# Patient Record
Sex: FEMALE
[De-identification: names, ages, dates, MRNs, and addresses within clinical notes are randomized per-mention and may not be internally consistent; named-entity substitution may affect disease eponyms.]

---

## 2022-11-29 ENCOUNTER — APPOINTMENT (OUTPATIENT)
Dept: OBGYN | Facility: CLINIC | Age: 27
End: 2022-11-29

## 2022-12-15 ENCOUNTER — TRANSCRIPTION ENCOUNTER (OUTPATIENT)
Age: 27
End: 2022-12-15

## 2022-12-15 ENCOUNTER — APPOINTMENT (OUTPATIENT)
Dept: OBGYN | Facility: CLINIC | Age: 27
End: 2022-12-15

## 2022-12-15 VITALS
WEIGHT: 155 LBS | DIASTOLIC BLOOD PRESSURE: 75 MMHG | BODY MASS INDEX: 24.05 KG/M2 | HEIGHT: 67.32 IN | SYSTOLIC BLOOD PRESSURE: 114 MMHG

## 2022-12-15 DIAGNOSIS — Z80.1 FAMILY HISTORY OF MALIGNANT NEOPLASM OF TRACHEA, BRONCHUS AND LUNG: ICD-10-CM

## 2022-12-15 DIAGNOSIS — Z80.3 FAMILY HISTORY OF MALIGNANT NEOPLASM OF BREAST: ICD-10-CM

## 2022-12-15 PROBLEM — Z00.00 ENCOUNTER FOR PREVENTIVE HEALTH EXAMINATION: Status: ACTIVE | Noted: 2022-12-15

## 2022-12-15 PROCEDURE — 99385 PREV VISIT NEW AGE 18-39: CPT

## 2022-12-15 NOTE — PLAN
[FreeTextEntry1] : 26 y/o  LMP unknown presents for annual \par \par HCM\par f/u pap\par declines STI testing\par Happy with Liletta IUD

## 2022-12-15 NOTE — HISTORY OF PRESENT ILLNESS
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

## 2023-01-01 LAB — CYTOLOGY CVX/VAG DOC THIN PREP: NORMAL

## 2023-10-23 ENCOUNTER — NON-APPOINTMENT (OUTPATIENT)
Age: 28
End: 2023-10-23

## 2023-10-24 ENCOUNTER — APPOINTMENT (OUTPATIENT)
Dept: OBGYN | Facility: CLINIC | Age: 28
End: 2023-10-24
Payer: COMMERCIAL

## 2023-10-24 VITALS
BODY MASS INDEX: 25.05 KG/M2 | DIASTOLIC BLOOD PRESSURE: 77 MMHG | WEIGHT: 161.5 LBS | HEIGHT: 67.32 IN | SYSTOLIC BLOOD PRESSURE: 126 MMHG

## 2023-10-24 PROCEDURE — 58301 REMOVE INTRAUTERINE DEVICE: CPT

## 2023-10-25 LAB
C TRACH RRNA SPEC QL NAA+PROBE: NOT DETECTED
N GONORRHOEA RRNA SPEC QL NAA+PROBE: NOT DETECTED
SOURCE AMPLIFICATION: NORMAL

## 2023-12-19 ENCOUNTER — APPOINTMENT (OUTPATIENT)
Dept: OBGYN | Facility: CLINIC | Age: 28
End: 2023-12-19
Payer: COMMERCIAL

## 2023-12-19 VITALS
HEIGHT: 67.5 IN | WEIGHT: 159 LBS | DIASTOLIC BLOOD PRESSURE: 72 MMHG | SYSTOLIC BLOOD PRESSURE: 127 MMHG | BODY MASS INDEX: 24.66 KG/M2

## 2023-12-19 PROCEDURE — 99395 PREV VISIT EST AGE 18-39: CPT

## 2023-12-19 NOTE — PLAN
[FreeTextEntry1] : 29 y/o    LMP 23 presents for annual   HCM up to date  declines STI testing   TTC  discussed timed intercourse

## 2023-12-19 NOTE — HISTORY OF PRESENT ILLNESS
[Patient reported PAP Smear was normal] : Patient reported PAP Smear was normal [Normal Amount/Duration] :  normal amount and duration [Regular Cycle Intervals] : periods have been regular [Currently Active] : currently active [Men] : men [Vaginal] : vaginal [No] : No [Patient refuses STI testing] : Patient refuses STI testing [FreeTextEntry1] : 29 y/o    LMP 23 presents for annual   No complaints.  Trying to conceive  POB: 2016:    Student in [employer], studying [employer] science [PapSmeardate] : 2022

## 2024-02-08 ENCOUNTER — APPOINTMENT (OUTPATIENT)
Dept: OBGYN | Facility: CLINIC | Age: 29
End: 2024-02-08
Payer: COMMERCIAL

## 2024-02-08 VITALS
SYSTOLIC BLOOD PRESSURE: 112 MMHG | HEIGHT: 67.5 IN | WEIGHT: 160 LBS | BODY MASS INDEX: 24.82 KG/M2 | DIASTOLIC BLOOD PRESSURE: 71 MMHG

## 2024-02-08 DIAGNOSIS — Z32.01 ENCOUNTER FOR PREGNANCY TEST, RESULT POSITIVE: ICD-10-CM

## 2024-02-08 PROCEDURE — 99213 OFFICE O/P EST LOW 20 MIN: CPT

## 2024-02-08 PROCEDURE — 76817 TRANSVAGINAL US OBSTETRIC: CPT

## 2024-02-08 NOTE — DISCUSSION/SUMMARY
[FreeTextEntry1] : 29 y/o P1 LMp 12/19/23 EDC 9/25/24 at 7wks 2 days  positive pregnancy test +SIUP on t/v us today  PNV NT OB US F/u 3 weeks as NEW OB obtian NIPS at next visit/counseled

## 2024-02-08 NOTE — REVIEW OF SYSTEMS
[Vomiting] : no vomiting [Nausea] : nausea [Negative] : Constitutional [FreeTextEntry8] : positive pregnancy test

## 2024-02-08 NOTE — HISTORY OF PRESENT ILLNESS
[FreeTextEntry1] : 29 y/o P1 LMp 12/19/23 EDC 9/25/24 at 7wks 2 days  positiv e pregnancy test + nausea/no vomiting, no VB

## 2024-03-04 ENCOUNTER — APPOINTMENT (OUTPATIENT)
Dept: OBGYN | Facility: CLINIC | Age: 29
End: 2024-03-04
Payer: COMMERCIAL

## 2024-03-04 VITALS
BODY MASS INDEX: 23.89 KG/M2 | HEIGHT: 67.5 IN | SYSTOLIC BLOOD PRESSURE: 121 MMHG | WEIGHT: 154 LBS | DIASTOLIC BLOOD PRESSURE: 75 MMHG

## 2024-03-04 DIAGNOSIS — Z34.91 ENCOUNTER FOR SUPERVISION OF NORMAL PREGNANCY, UNSPECIFIED, FIRST TRIMESTER: ICD-10-CM

## 2024-03-04 PROCEDURE — 0500F INITIAL PRENATAL CARE VISIT: CPT

## 2024-03-06 LAB
ABO + RH PNL BLD: NORMAL
B19V IGG SER QL IA: 0.16 INDEX
B19V IGG+IGM SER-IMP: NEGATIVE
B19V IGG+IGM SER-IMP: NORMAL
B19V IGM FLD-ACNC: 0.19 INDEX
B19V IGM SER-ACNC: NEGATIVE
BACTERIA UR CULT: NORMAL
BASOPHILS # BLD AUTO: 0.04 K/UL
BASOPHILS NFR BLD AUTO: 0.6 %
BLD GP AB SCN SERPL QL: NORMAL
C TRACH RRNA SPEC QL NAA+PROBE: NOT DETECTED
CMV IGG SERPL QL: 2.2 U/ML
CMV IGG SERPL-IMP: POSITIVE
CMV IGM SERPL QL: 51.1 AU/ML
CMV IGM SERPL QL: POSITIVE
EOSINOPHIL # BLD AUTO: 0.09 K/UL
EOSINOPHIL NFR BLD AUTO: 1.3 %
HBV SURFACE AG SER QL: NONREACTIVE
HCT VFR BLD CALC: 35 %
HCV AB SER QL: NONREACTIVE
HCV S/CO RATIO: 0.11 S/CO
HGB A MFR BLD: 96.7 %
HGB A2 MFR BLD: 2.9 %
HGB BLD-MCNC: 12 G/DL
HGB F MFR BLD: 0.4 %
HGB FRACT BLD-IMP: NORMAL
HIV1+2 AB SPEC QL IA.RAPID: NONREACTIVE
IMM GRANULOCYTES NFR BLD AUTO: 0.4 %
LEAD BLD-MCNC: <1 UG/DL
LYMPHOCYTES # BLD AUTO: 1.82 K/UL
LYMPHOCYTES NFR BLD AUTO: 26.5 %
MAN DIFF?: NORMAL
MCHC RBC-ENTMCNC: 30.5 PG
MCHC RBC-ENTMCNC: 34.3 GM/DL
MCV RBC AUTO: 89.1 FL
MEV IGG FLD QL IA: <5 AU/ML
MEV IGG+IGM SER-IMP: NEGATIVE
MONOCYTES # BLD AUTO: 0.39 K/UL
MONOCYTES NFR BLD AUTO: 5.7 %
N GONORRHOEA RRNA SPEC QL NAA+PROBE: NOT DETECTED
NEUTROPHILS # BLD AUTO: 4.5 K/UL
NEUTROPHILS NFR BLD AUTO: 65.5 %
PLATELET # BLD AUTO: 276 K/UL
RBC # BLD: 3.93 M/UL
RBC # FLD: 13.6 %
RUBV IGG FLD-ACNC: 3.5 INDEX
RUBV IGG SER-IMP: POSITIVE
SOURCE AMPLIFICATION: NORMAL
T PALLIDUM AB SER QL IA: NEGATIVE
TSH SERPL-ACNC: 0.97 UIU/ML
VZV AB TITR SER: POSITIVE
VZV IGG SER IF-ACNC: 1377 INDEX
WBC # FLD AUTO: 6.87 K/UL

## 2024-03-08 LAB — FMR1 GENE MUT ANL BLD/T: NORMAL

## 2024-03-09 LAB — AR GENE MUT ANL BLD/T: NORMAL

## 2024-03-13 ENCOUNTER — ASOB RESULT (OUTPATIENT)
Age: 29
End: 2024-03-13

## 2024-03-13 ENCOUNTER — APPOINTMENT (OUTPATIENT)
Dept: ANTEPARTUM | Facility: CLINIC | Age: 29
End: 2024-03-13
Payer: COMMERCIAL

## 2024-03-13 PROCEDURE — 76801 OB US < 14 WKS SINGLE FETUS: CPT

## 2024-03-13 PROCEDURE — 76813 OB US NUCHAL MEAS 1 GEST: CPT | Mod: 59

## 2024-03-17 LAB — CFTR MUT TESTED BLD/T: NEGATIVE

## 2024-03-18 ENCOUNTER — TRANSCRIPTION ENCOUNTER (OUTPATIENT)
Age: 29
End: 2024-03-18

## 2024-04-01 ENCOUNTER — TRANSCRIPTION ENCOUNTER (OUTPATIENT)
Age: 29
End: 2024-04-01

## 2024-04-04 ENCOUNTER — APPOINTMENT (OUTPATIENT)
Dept: OBGYN | Facility: CLINIC | Age: 29
End: 2024-04-04
Payer: COMMERCIAL

## 2024-04-04 VITALS
SYSTOLIC BLOOD PRESSURE: 107 MMHG | WEIGHT: 157 LBS | DIASTOLIC BLOOD PRESSURE: 71 MMHG | BODY MASS INDEX: 24.35 KG/M2 | HEIGHT: 67.5 IN

## 2024-04-04 DIAGNOSIS — Z34.92 ENCOUNTER FOR SUPERVISION OF NORMAL PREGNANCY, UNSPECIFIED, SECOND TRIMESTER: ICD-10-CM

## 2024-04-04 PROCEDURE — 0502F SUBSEQUENT PRENATAL CARE: CPT

## 2024-04-08 ENCOUNTER — NON-APPOINTMENT (OUTPATIENT)
Age: 29
End: 2024-04-08

## 2024-04-08 LAB
AFP MOM: 0.91
AFP VALUE: 29.8 NG/ML
ALPHA FETOPROTEIN SERUM COMMENT: NORMAL
ALPHA FETOPROTEIN SERUM INTERPRETATION: NORMAL
ALPHA FETOPROTEIN SERUM RESULTS: NORMAL
ALPHA FETOPROTEIN SERUM TEST RESULTS: NORMAL
GESTATIONAL AGE BASED ON: NORMAL
GESTATIONAL AGE ON COLLECTION DATE: 15.4 WEEKS
INSULIN DEP DIABETES: NO
MATERNAL AGE AT EDD AFP: 28.9 YR
MULTIPLE GESTATION: NO
OSBR RISK 1 IN: NORMAL
RACE: NORMAL
WEIGHT AFP: 139 LBS

## 2024-04-11 DIAGNOSIS — B34.3 PARVOVIRUS INFECTION, UNSPECIFIED: ICD-10-CM

## 2024-04-11 DIAGNOSIS — Z34.92 ENCOUNTER FOR SUPERVISION OF NORMAL PREGNANCY, UNSPECIFIED, SECOND TRIMESTER: ICD-10-CM

## 2024-04-15 LAB
B19V IGG SER QL IA: 0.69 INDEX
B19V IGG+IGM SER-IMP: NEGATIVE
B19V IGG+IGM SER-IMP: NORMAL
B19V IGM FLD-ACNC: 7.92 INDEX
B19V IGM SER-ACNC: POSITIVE

## 2024-04-17 ENCOUNTER — APPOINTMENT (OUTPATIENT)
Dept: ANTEPARTUM | Facility: CLINIC | Age: 29
End: 2024-04-17
Payer: COMMERCIAL

## 2024-04-17 ENCOUNTER — NON-APPOINTMENT (OUTPATIENT)
Age: 29
End: 2024-04-17

## 2024-04-17 PROCEDURE — 76805 OB US >/= 14 WKS SNGL FETUS: CPT

## 2024-04-17 PROCEDURE — 99204 OFFICE O/P NEW MOD 45 MIN: CPT | Mod: 25

## 2024-04-19 ENCOUNTER — APPOINTMENT (OUTPATIENT)
Dept: ANTEPARTUM | Facility: CLINIC | Age: 29
End: 2024-04-19
Payer: COMMERCIAL

## 2024-04-19 ENCOUNTER — ASOB RESULT (OUTPATIENT)
Age: 29
End: 2024-04-19

## 2024-04-19 PROCEDURE — 76821 MIDDLE CEREBRAL ARTERY ECHO: CPT

## 2024-04-19 PROCEDURE — 76815 OB US LIMITED FETUS(S): CPT | Mod: 59

## 2024-04-22 ENCOUNTER — NON-APPOINTMENT (OUTPATIENT)
Age: 29
End: 2024-04-22

## 2024-04-22 ENCOUNTER — ASOB RESULT (OUTPATIENT)
Age: 29
End: 2024-04-22

## 2024-04-22 ENCOUNTER — APPOINTMENT (OUTPATIENT)
Dept: ANTEPARTUM | Facility: CLINIC | Age: 29
End: 2024-04-22
Payer: COMMERCIAL

## 2024-04-22 PROCEDURE — 76821 MIDDLE CEREBRAL ARTERY ECHO: CPT

## 2024-04-22 PROCEDURE — 76815 OB US LIMITED FETUS(S): CPT | Mod: 59

## 2024-04-23 LAB — CMV IGG AVIDITY SERPL IA-RTO: 0.89

## 2024-04-25 ENCOUNTER — ASOB RESULT (OUTPATIENT)
Age: 29
End: 2024-04-25

## 2024-04-25 ENCOUNTER — NON-APPOINTMENT (OUTPATIENT)
Age: 29
End: 2024-04-25

## 2024-04-25 ENCOUNTER — APPOINTMENT (OUTPATIENT)
Dept: ANTEPARTUM | Facility: CLINIC | Age: 29
End: 2024-04-25
Payer: COMMERCIAL

## 2024-04-25 PROCEDURE — 76815 OB US LIMITED FETUS(S): CPT | Mod: 59

## 2024-04-25 PROCEDURE — 99213 OFFICE O/P EST LOW 20 MIN: CPT | Mod: 25

## 2024-04-25 PROCEDURE — 76821 MIDDLE CEREBRAL ARTERY ECHO: CPT

## 2024-04-26 ENCOUNTER — ASOB RESULT (OUTPATIENT)
Age: 29
End: 2024-04-26

## 2024-04-26 ENCOUNTER — APPOINTMENT (OUTPATIENT)
Dept: ANTEPARTUM | Facility: CLINIC | Age: 29
End: 2024-04-26

## 2024-04-26 ENCOUNTER — OUTPATIENT (OUTPATIENT)
Dept: INPATIENT UNIT | Facility: HOSPITAL | Age: 29
LOS: 1 days | End: 2024-04-26
Payer: COMMERCIAL

## 2024-04-26 VITALS
RESPIRATION RATE: 18 BRPM | SYSTOLIC BLOOD PRESSURE: 108 MMHG | DIASTOLIC BLOOD PRESSURE: 60 MMHG | TEMPERATURE: 99 F | OXYGEN SATURATION: 100 % | HEART RATE: 69 BPM

## 2024-04-26 VITALS — SYSTOLIC BLOOD PRESSURE: 99 MMHG | DIASTOLIC BLOOD PRESSURE: 54 MMHG | HEART RATE: 67 BPM | TEMPERATURE: 98 F

## 2024-04-26 DIAGNOSIS — O26.899 OTHER SPECIFIED PREGNANCY RELATED CONDITIONS, UNSPECIFIED TRIMESTER: ICD-10-CM

## 2024-04-26 DIAGNOSIS — O98.512 OTHER VIRAL DISEASES COMPLICATING PREGNANCY, SECOND TRIMESTER: ICD-10-CM

## 2024-04-26 DIAGNOSIS — Z3A.18 18 WEEKS GESTATION OF PREGNANCY: ICD-10-CM

## 2024-04-26 DIAGNOSIS — O36.8220: ICD-10-CM

## 2024-04-26 DIAGNOSIS — B34.3 PARVOVIRUS INFECTION, UNSPECIFIED: ICD-10-CM

## 2024-04-26 LAB
HCT VFR BLD CALC: 12.2 % — CRITICAL LOW (ref 34.5–45)
HCT VFR BLD CALC: 39.5 % — SIGNIFICANT CHANGE UP (ref 34.5–45)
HGB BLD-MCNC: 14 G/DL — SIGNIFICANT CHANGE UP (ref 11.5–15.5)
HGB BLD-MCNC: 4.1 G/DL — CRITICAL LOW (ref 11.5–15.5)
PLATELET # BLD AUTO: 103 K/UL — LOW (ref 150–400)
PLATELET # BLD AUTO: 174 K/UL — SIGNIFICANT CHANGE UP (ref 150–400)

## 2024-04-26 PROCEDURE — 36415 COLL VENOUS BLD VENIPUNCTURE: CPT

## 2024-04-26 PROCEDURE — 36460 INTRAUTERINE TRANSFUSION FTL: CPT

## 2024-04-26 PROCEDURE — P9022: CPT

## 2024-04-26 PROCEDURE — 86923 COMPATIBILITY TEST ELECTRIC: CPT

## 2024-04-26 PROCEDURE — 85014 HEMATOCRIT: CPT

## 2024-04-26 PROCEDURE — 85018 HEMOGLOBIN: CPT

## 2024-04-26 PROCEDURE — G0463: CPT

## 2024-04-26 PROCEDURE — 96361 HYDRATE IV INFUSION ADD-ON: CPT

## 2024-04-26 PROCEDURE — 59012 FETAL CORD PUNCTURE PRENATAL: CPT

## 2024-04-26 PROCEDURE — 76941 ECHO GUIDE FOR TRANSFUSION: CPT

## 2024-04-26 PROCEDURE — 96365 THER/PROPH/DIAG IV INF INIT: CPT

## 2024-04-26 PROCEDURE — 86901 BLOOD TYPING SEROLOGIC RH(D): CPT

## 2024-04-26 PROCEDURE — 96372 THER/PROPH/DIAG INJ SC/IM: CPT

## 2024-04-26 PROCEDURE — 85049 AUTOMATED PLATELET COUNT: CPT

## 2024-04-26 PROCEDURE — 86900 BLOOD TYPING SEROLOGIC ABO: CPT

## 2024-04-26 PROCEDURE — 86850 RBC ANTIBODY SCREEN: CPT

## 2024-04-26 PROCEDURE — 76941 ECHO GUIDE FOR TRANSFUSION: CPT | Mod: 26

## 2024-04-26 RX ORDER — CISATRACURIUM BESYLATE 2 MG/ML
0.08 INJECTION INTRAVENOUS ONCE
Refills: 0 | Status: DISCONTINUED | OUTPATIENT
Start: 2024-04-26 | End: 2024-04-26

## 2024-04-26 RX ORDER — SODIUM CHLORIDE 9 MG/ML
1000 INJECTION, SOLUTION INTRAVENOUS
Refills: 0 | Status: DISCONTINUED | OUTPATIENT
Start: 2024-04-26 | End: 2024-05-10

## 2024-04-26 RX ORDER — BUTORPHANOL TARTRATE 2 MG/ML
2 INJECTION, SOLUTION INTRAMUSCULAR; INTRAVENOUS ONCE
Refills: 0 | Status: DISCONTINUED | OUTPATIENT
Start: 2024-04-26 | End: 2024-04-26

## 2024-04-26 RX ORDER — HEPARIN SODIUM 5000 [USP'U]/ML
10000 INJECTION INTRAVENOUS; SUBCUTANEOUS ONCE
Refills: 0 | Status: DISCONTINUED | OUTPATIENT
Start: 2024-04-26 | End: 2024-05-10

## 2024-04-26 RX ORDER — CEFAZOLIN SODIUM 1 G
1000 VIAL (EA) INJECTION ONCE
Refills: 0 | Status: COMPLETED | OUTPATIENT
Start: 2024-04-26 | End: 2024-04-26

## 2024-04-26 RX ORDER — LIDOCAINE HCL 20 MG/ML
10 VIAL (ML) INJECTION ONCE
Refills: 0 | Status: DISCONTINUED | OUTPATIENT
Start: 2024-04-26 | End: 2024-05-10

## 2024-04-26 RX ORDER — SODIUM CHLORIDE 9 MG/ML
1000 INJECTION, SOLUTION INTRAVENOUS ONCE
Refills: 0 | Status: COMPLETED | OUTPATIENT
Start: 2024-04-26 | End: 2024-04-26

## 2024-04-26 RX ADMIN — SODIUM CHLORIDE 1000 MILLILITER(S): 9 INJECTION, SOLUTION INTRAVENOUS at 07:23

## 2024-04-26 RX ADMIN — BUTORPHANOL TARTRATE 2 MILLIGRAM(S): 2 INJECTION, SOLUTION INTRAMUSCULAR; INTRAVENOUS at 09:36

## 2024-04-26 RX ADMIN — Medication 100 MILLIGRAM(S): at 08:16

## 2024-04-26 NOTE — OB PROVIDER TRIAGE NOTE - NSHPLABSRESULTS_GEN_ALL_CORE
Fetal H/H Pre Transfusion  Hemoglobin + Hematocrit (04.26.24 @ 10:50)    Hemoglobin: 4.1 g/dL    Hematocrit: 12.2 %    Fetal H/H Post Transfusion  Hemoglobin + Hematocrit (04.26.24 @ 11:09)    Hemoglobin: 14.0 g/dL    Hematocrit: 39.5 %    Baseline Fetal Platelet    Platelet Count - Automated (04.26.24 @ 10:50)    Platelet Count - Automated: 174 K/uL    Platelet Count - Automated (04.26.24 @ 11:09)    Platelet Count - Automated: 103 K/uL

## 2024-04-26 NOTE — PROCEDURE NOTE - ADDITIONAL PROCEDURE DETAILS
Counseling on fetal transfusion    Risks and management of fetal anemia were discussed with the patient extensively. If untreated, severe fetal anemia from parvovirus can be dangerous for a fetus and in the worst case scenario result in fetal demise. We explained that an in utero transfusion (IUT) can temporarily improve fetal anemia and prevent fetal distress or heart failure, allowing the fetus to safely remain in utero until a gestational age closer to full term. Fetal loss risk 1-2%.     I reviewed that IUT is associated with ~ 2% risk of procedure related complications that could necessitate  birth (PPROM, abruption, premature labor, infection, FHR abnormalities).     Patient received 2mg IM Stadol for anesthesia. Sterile technique was utilized. A 20 gauge needle was used to enter the abdomen and ultrasound guidance was used to enter the umbilical vein itself. On first attempt of entry, no fetal blood was removed and amniotic fluid came back. Following first attempt, a collection was noted within the umbilical cord that did not appear to obstruct or interrupt blood flow from the umbilical artery or vein. After monitoring of normal fetal heart rate and no additional concerns, a second attempt was made. Fetal blood was removed from the umbilical vein and sent to the lab: HCT 12.2 and . Therefore, goal of a 40% HCT was set, decision was made to not transfuse platelets and 12cc of blood were transfused. Following transfusion, a post transfusion sample was obtained and HCT klever to 39.5 with a PLT of 107. The patient tolerated the procedure well. Normal fetal heart rate was noted following transfusion as well as improvement in MCA dopplers. Area of umbilical cord with noted collection after first attempt was reassessed and noted to be resolving/smaller than when previously noted.     Plan for patient to be monitored in the recovery room for 1 hour following.

## 2024-04-26 NOTE — OB PROVIDER TRIAGE NOTE - NSOBPROVIDERNOTE_OBGYN_ALL_OB_FT
28yoF  @ 18w4d w/ h/o Parvovirus infx presented for fetal blood transfusion, VSS and maternal and fetal status reassuring preprocedure:    - Obtain maternal T&S  - MFM to perform US guided fetal blood transfusion    Amyeo Afroz Jereen, PGY-3  d/w Dr Bryan Herzog    Update:  Fetal blood transfusion successful with appropriate fetal rise in hemoglobin/hematocrit.  Post-procedure BSUS by Dr Bryan Herzog, fetal reassuring, FHR+ and FM+, adequate fluid around fetus.  No activity on toco over 1 hour. No concern for PTCx or LOF.  Return precautions for leakage of fluid, contractions or VB, fevers or chills, reviewed w/ pt.  Discharge home w/ f/u w/ MFM on  and OB 5/3.    Amyeo Afroz Jereen, PGY-3  Seen and d/w Dr Bryan Herzog

## 2024-04-26 NOTE — OB PROVIDER TRIAGE NOTE - HISTORY OF PRESENT ILLNESS
Subjective  HPI: 28yoF  @ 18w4d gestation with no significant PMHx presents for fetal blood transfusion i/s/o parvovirus B19. Patient denies contractions, leakage of fluid, and vaginal bleeding. She endorses feeling the baby move. She has no specific concerns at this time. She was last seen at [facility] yesterday. She reports she had a fetal anatomy u/s on Monday () at which time the baby was a normal size.    PNC: Fetal anemia 2/2 parvovirus B19.    OBHx: . 1st pregnancy was uncomplicated and pt delivered at 41-42 weeks. This pregnancy has been complicated by fetal anemia. Pt denies abnormal BPs and blood glucose measurements during this pregnancy.  GynHx: denies hx STIs, fibroids, polyps, cysts  PMH: denies hx clotting or bleeding disorders, HTN, DM, thyroid disease, anemia, and asthma  PSH: [place] tooth removal and removal of a benign mass on the buttock. No adverse reactions to anesthesia.  Social: denies etoh, smoking, drugs. No anxiety, depression, post-partum depression.  Meds: PNV   Allergies: NKDA  Will accept blood transfusions? Yes    Objective:  Vitals: /60, HR 71, SpO2 100% on RA  CV: RRR  Pulm: breathing comfortably on RA  Abd: gravid, nontender  Extr: moving all extremities with ease    Assessment  28yoF  @ 18w4d gestation with no significant PMHx presents for fetal blood transfusion i/s/o parvovirus B19.    Plan  1. Admit to [facility]. Routine Labs. IVF.  2. Prenatal issues: fetal anemia  3. Pt will receive fetal blood transfusion. Labs ordered prior to procedure: CBC, type and screen. Abx ppx: Cefazolin 1000 mg  4. Pain: 1 dose of Butorphanol 2 mg IM    Patient discussed with attending physician, Dr. Patel     Subjective  HPI: 28yoF  @ 18w4d gestation with no significant PMHx presents for fetal blood transfusion i/s/o parvovirus B19. Patient denies contractions, leakage of fluid, and vaginal bleeding. She endorses feeling the baby move. She has no specific concerns at this time. Per pt, on , her son developed a rash. She took him to the pediatrician, and he was diagnosed with parvovirus B19. The pediatrician suggested the pt also be tested for parvovirus B19, and pt was found to have parvovirus antibodies despite having no fevers or rash. Pt mentioned she had back pain approximately 1 week prior to her parvovirus diagnosis. Pt has been seeing [facility] for the past 2 weeks and has been getting regular fetal u/s of the MCA. Blood flow through the MCA was found to be increased and a fetal blood transfusion was recommended to address fetal anemia. Pt was last seen at [facility] yesterday, and she had a fetal anatomy u/s on Monday () at which time the baby was a normal size.    PNC: Fetal anemia 2/2 parvovirus B19.    OBHx: . 1st pregnancy was uncomplicated and pt delivered at 41-42 weeks. This pregnancy has been complicated by fetal anemia. Pt denies abnormal BPs and blood glucose measurements during this pregnancy.  GynHx: denies hx STIs, fibroids, polyps, cysts  PMH: denies hx clotting or bleeding disorders, HTN, DM, thyroid disease, anemia, and asthma  PSH: [place] tooth removal and removal of a benign mass on the buttock. No adverse reactions to anesthesia.  Social: denies etoh, smoking, drugs. No anxiety, depression, post-partum depression.  Meds: PNV   Allergies: NKDA  Will accept blood transfusions? Yes    Objective:  Vitals: /60, HR 71, SpO2 100% on RA  CV: RRR  Pulm: breathing comfortably on RA  Abd: gravid, nontender  Extr: moving all extremities with ease    Assessment  28yoF  @ 18w4d gestation with no significant PMHx presents for fetal blood transfusion i/s/o parvovirus B19.    Plan  1. Admit to [facility]. Routine Labs. IVF.  2. Prenatal issues: fetal anemia  3. Pt will receive fetal blood transfusion. Labs ordered prior to procedure: CBC, type and screen. Abx ppx: Cefazolin 1000 mg  4. Pain: 1 dose of Butorphanol 2 mg IM    Patient discussed with attending physician, Dr. Patel     Subjective  HPI: 28yoF  @ 18w4d gestation with no significant PMHx presents for fetal blood transfusion i/s/o parvovirus B19. Patient denies contractions, leakage of fluid, and vaginal bleeding. She endorses feeling the baby move. She has no specific concerns at this time. Per pt, on , her son developed a rash. She took him to the pediatrician, and he was diagnosed with parvovirus B19. The pediatrician suggested the pt also be tested for parvovirus B19, and pt was found to have parvovirus antibodies despite having no fevers or rash. Pt mentioned she had back pain approximately 1 week prior to her parvovirus diagnosis. Pt has been seeing [facility] for the past 2 weeks and has been getting regular fetal u/s of the MCA. Blood flow through the MCA was found to be increased and a fetal blood transfusion was recommended to address fetal anemia. Pt was last seen at [facility] yesterday, and she had a fetal anatomy u/s on Monday () at which time the baby was a normal size.    PNC: Fetal anemia 2/2 parvovirus B19.    OBHx: . 1st pregnancy was uncomplicated and pt delivered at 41-42 weeks. This pregnancy has been complicated by fetal anemia. Pt denies abnormal BPs and blood glucose measurements during this pregnancy.  GynHx: denies hx STIs, fibroids, polyps, cysts  PMH: denies hx clotting or bleeding disorders, HTN, DM, thyroid disease, anemia, and asthma  PSH: [place] tooth removal and removal of a benign mass on the buttock. No adverse reactions to anesthesia.  Social: denies etoh, smoking, drugs. No anxiety, depression, post-partum depression.  Meds: PNV   Allergies: NKDA  Will accept blood transfusions? Yes    Objective:  Vitals: /60, HR 71, SpO2 100% on RA  CV: RRR  Pulm: breathing comfortably on RA  Abd: gravid, nontender

## 2024-04-29 ENCOUNTER — APPOINTMENT (OUTPATIENT)
Dept: ANTEPARTUM | Facility: CLINIC | Age: 29
End: 2024-04-29
Payer: COMMERCIAL

## 2024-04-29 ENCOUNTER — ASOB RESULT (OUTPATIENT)
Age: 29
End: 2024-04-29

## 2024-04-29 PROBLEM — Z78.9 OTHER SPECIFIED HEALTH STATUS: Chronic | Status: ACTIVE | Noted: 2024-04-26

## 2024-04-29 PROCEDURE — 76815 OB US LIMITED FETUS(S): CPT | Mod: 59

## 2024-04-29 PROCEDURE — 76821 MIDDLE CEREBRAL ARTERY ECHO: CPT

## 2024-05-02 ENCOUNTER — NON-APPOINTMENT (OUTPATIENT)
Age: 29
End: 2024-05-02

## 2024-05-03 ENCOUNTER — APPOINTMENT (OUTPATIENT)
Dept: OBGYN | Facility: CLINIC | Age: 29
End: 2024-05-03
Payer: COMMERCIAL

## 2024-05-03 VITALS
BODY MASS INDEX: 23.89 KG/M2 | WEIGHT: 154 LBS | HEIGHT: 67.5 IN | SYSTOLIC BLOOD PRESSURE: 98 MMHG | DIASTOLIC BLOOD PRESSURE: 66 MMHG

## 2024-05-03 PROCEDURE — 0502F SUBSEQUENT PRENATAL CARE: CPT

## 2024-05-04 DIAGNOSIS — O98.512 OTHER VIRAL DISEASES COMPLICATING PREGNANCY, SECOND TRIMESTER: ICD-10-CM

## 2024-05-04 DIAGNOSIS — Z3A.18 18 WEEKS GESTATION OF PREGNANCY: ICD-10-CM

## 2024-05-04 DIAGNOSIS — O36.8220: ICD-10-CM

## 2024-05-08 ENCOUNTER — APPOINTMENT (OUTPATIENT)
Dept: ANTEPARTUM | Facility: CLINIC | Age: 29
End: 2024-05-08

## 2024-05-08 ENCOUNTER — ASOB RESULT (OUTPATIENT)
Age: 29
End: 2024-05-08

## 2024-05-08 PROCEDURE — 76815 OB US LIMITED FETUS(S): CPT | Mod: 59

## 2024-05-08 PROCEDURE — 76821 MIDDLE CEREBRAL ARTERY ECHO: CPT

## 2024-05-13 ENCOUNTER — ASOB RESULT (OUTPATIENT)
Age: 29
End: 2024-05-13

## 2024-05-13 ENCOUNTER — APPOINTMENT (OUTPATIENT)
Dept: ANTEPARTUM | Facility: CLINIC | Age: 29
End: 2024-05-13
Payer: COMMERCIAL

## 2024-05-13 PROCEDURE — 99212 OFFICE O/P EST SF 10 MIN: CPT | Mod: 25

## 2024-05-13 PROCEDURE — 76821 MIDDLE CEREBRAL ARTERY ECHO: CPT

## 2024-05-13 PROCEDURE — 76811 OB US DETAILED SNGL FETUS: CPT

## 2024-05-14 ENCOUNTER — APPOINTMENT (OUTPATIENT)
Dept: PEDIATRIC CARDIOLOGY | Facility: CLINIC | Age: 29
End: 2024-05-14
Payer: COMMERCIAL

## 2024-05-14 PROCEDURE — 76827 ECHO EXAM OF FETAL HEART: CPT

## 2024-05-14 PROCEDURE — 76825 ECHO EXAM OF FETAL HEART: CPT

## 2024-05-14 PROCEDURE — 93325 DOPPLER ECHO COLOR FLOW MAPG: CPT

## 2024-05-14 PROCEDURE — 99202 OFFICE O/P NEW SF 15 MIN: CPT | Mod: 25

## 2024-05-15 ENCOUNTER — APPOINTMENT (OUTPATIENT)
Dept: ANTEPARTUM | Facility: CLINIC | Age: 29
End: 2024-05-15
Payer: COMMERCIAL

## 2024-05-15 ENCOUNTER — ASOB RESULT (OUTPATIENT)
Age: 29
End: 2024-05-15

## 2024-05-15 PROCEDURE — 76821 MIDDLE CEREBRAL ARTERY ECHO: CPT | Mod: 59

## 2024-05-15 PROCEDURE — 76816 OB US FOLLOW-UP PER FETUS: CPT

## 2024-05-17 ENCOUNTER — APPOINTMENT (OUTPATIENT)
Dept: PEDIATRIC CARDIOLOGY | Facility: CLINIC | Age: 29
End: 2024-05-17

## 2024-05-20 ENCOUNTER — NON-APPOINTMENT (OUTPATIENT)
Age: 29
End: 2024-05-20

## 2024-05-20 ENCOUNTER — APPOINTMENT (OUTPATIENT)
Dept: OBGYN | Facility: CLINIC | Age: 29
End: 2024-05-20
Payer: COMMERCIAL

## 2024-05-20 VITALS
SYSTOLIC BLOOD PRESSURE: 113 MMHG | BODY MASS INDEX: 24.35 KG/M2 | WEIGHT: 157 LBS | HEIGHT: 67.5 IN | DIASTOLIC BLOOD PRESSURE: 72 MMHG

## 2024-05-20 PROCEDURE — 0502F SUBSEQUENT PRENATAL CARE: CPT

## 2024-05-21 ENCOUNTER — APPOINTMENT (OUTPATIENT)
Dept: PEDIATRIC CARDIOLOGY | Facility: CLINIC | Age: 29
End: 2024-05-21
Payer: COMMERCIAL

## 2024-05-21 PROCEDURE — 76828 ECHO EXAM OF FETAL HEART: CPT

## 2024-05-21 PROCEDURE — 76820 UMBILICAL ARTERY ECHO: CPT

## 2024-05-21 PROCEDURE — 99214 OFFICE O/P EST MOD 30 MIN: CPT | Mod: 25

## 2024-05-21 PROCEDURE — 93325 DOPPLER ECHO COLOR FLOW MAPG: CPT | Mod: 59

## 2024-05-21 PROCEDURE — 76826 ECHO EXAM OF FETAL HEART: CPT

## 2024-05-22 ENCOUNTER — APPOINTMENT (OUTPATIENT)
Dept: ANTEPARTUM | Facility: CLINIC | Age: 29
End: 2024-05-22

## 2024-05-22 ENCOUNTER — ASOB RESULT (OUTPATIENT)
Age: 29
End: 2024-05-22

## 2024-05-22 PROCEDURE — 76815 OB US LIMITED FETUS(S): CPT | Mod: 59

## 2024-05-22 PROCEDURE — 76821 MIDDLE CEREBRAL ARTERY ECHO: CPT

## 2024-05-30 ENCOUNTER — ASOB RESULT (OUTPATIENT)
Age: 29
End: 2024-05-30

## 2024-05-30 ENCOUNTER — APPOINTMENT (OUTPATIENT)
Dept: ANTEPARTUM | Facility: CLINIC | Age: 29
End: 2024-05-30

## 2024-05-30 PROCEDURE — 76815 OB US LIMITED FETUS(S): CPT | Mod: 59

## 2024-05-30 PROCEDURE — 76821 MIDDLE CEREBRAL ARTERY ECHO: CPT

## 2024-06-07 ENCOUNTER — ASOB RESULT (OUTPATIENT)
Age: 29
End: 2024-06-07

## 2024-06-07 ENCOUNTER — APPOINTMENT (OUTPATIENT)
Dept: ANTEPARTUM | Facility: CLINIC | Age: 29
End: 2024-06-07
Payer: COMMERCIAL

## 2024-06-07 PROCEDURE — 76821 MIDDLE CEREBRAL ARTERY ECHO: CPT | Mod: 59

## 2024-06-07 PROCEDURE — 76816 OB US FOLLOW-UP PER FETUS: CPT

## 2024-06-14 ENCOUNTER — APPOINTMENT (OUTPATIENT)
Dept: ULTRASOUND IMAGING | Facility: CLINIC | Age: 29
End: 2024-06-14
Payer: COMMERCIAL

## 2024-06-14 ENCOUNTER — NON-APPOINTMENT (OUTPATIENT)
Age: 29
End: 2024-06-14

## 2024-06-14 ENCOUNTER — APPOINTMENT (OUTPATIENT)
Dept: ANTEPARTUM | Facility: CLINIC | Age: 29
End: 2024-06-14
Payer: COMMERCIAL

## 2024-06-14 ENCOUNTER — RESULT REVIEW (OUTPATIENT)
Age: 29
End: 2024-06-14

## 2024-06-14 ENCOUNTER — OUTPATIENT (OUTPATIENT)
Dept: OUTPATIENT SERVICES | Facility: HOSPITAL | Age: 29
LOS: 1 days | End: 2024-06-14
Payer: COMMERCIAL

## 2024-06-14 ENCOUNTER — ASOB RESULT (OUTPATIENT)
Age: 29
End: 2024-06-14

## 2024-06-14 ENCOUNTER — APPOINTMENT (OUTPATIENT)
Dept: OBGYN | Facility: CLINIC | Age: 29
End: 2024-06-14
Payer: COMMERCIAL

## 2024-06-14 VITALS
WEIGHT: 158.13 LBS | BODY MASS INDEX: 24.53 KG/M2 | HEIGHT: 67.5 IN | DIASTOLIC BLOOD PRESSURE: 67 MMHG | SYSTOLIC BLOOD PRESSURE: 105 MMHG

## 2024-06-14 DIAGNOSIS — M79.669 PAIN IN UNSPECIFIED LOWER LEG: ICD-10-CM

## 2024-06-14 PROCEDURE — 0502F SUBSEQUENT PRENATAL CARE: CPT

## 2024-06-14 PROCEDURE — 93970 EXTREMITY STUDY: CPT | Mod: 26

## 2024-06-14 PROCEDURE — 76819 FETAL BIOPHYS PROFIL W/O NST: CPT

## 2024-06-14 PROCEDURE — 76821 MIDDLE CEREBRAL ARTERY ECHO: CPT

## 2024-06-14 PROCEDURE — 93970 EXTREMITY STUDY: CPT

## 2024-06-18 ENCOUNTER — TRANSCRIPTION ENCOUNTER (OUTPATIENT)
Age: 29
End: 2024-06-18

## 2024-06-18 LAB
BASOPHILS # BLD AUTO: 0.05 K/UL
BASOPHILS NFR BLD AUTO: 0.7 %
EOSINOPHIL # BLD AUTO: 0.19 K/UL
EOSINOPHIL NFR BLD AUTO: 2.7 %
GLUCOSE 1H P 50 G GLC PO SERPL-MCNC: 91 MG/DL
HCT VFR BLD CALC: 34.4 %
HGB BLD-MCNC: 11.2 G/DL
HIV1+2 AB SPEC QL IA.RAPID: NONREACTIVE
IMM GRANULOCYTES NFR BLD AUTO: 0.3 %
LYMPHOCYTES # BLD AUTO: 1.36 K/UL
LYMPHOCYTES NFR BLD AUTO: 19.6 %
MAN DIFF?: NORMAL
MCHC RBC-ENTMCNC: 31 PG
MCHC RBC-ENTMCNC: 32.6 GM/DL
MCV RBC AUTO: 95.3 FL
MONOCYTES # BLD AUTO: 0.49 K/UL
MONOCYTES NFR BLD AUTO: 7.1 %
NEUTROPHILS # BLD AUTO: 4.84 K/UL
NEUTROPHILS NFR BLD AUTO: 69.6 %
PLATELET # BLD AUTO: 278 K/UL
RBC # BLD: 3.61 M/UL
RBC # FLD: 13.9 %
T PALLIDUM AB SER QL IA: NEGATIVE
WBC # FLD AUTO: 6.95 K/UL

## 2024-06-24 ENCOUNTER — ASOB RESULT (OUTPATIENT)
Age: 29
End: 2024-06-24

## 2024-06-24 ENCOUNTER — APPOINTMENT (OUTPATIENT)
Dept: ANTEPARTUM | Facility: CLINIC | Age: 29
End: 2024-06-24

## 2024-06-24 ENCOUNTER — APPOINTMENT (OUTPATIENT)
Dept: ANTEPARTUM | Facility: CLINIC | Age: 29
End: 2024-06-24
Payer: COMMERCIAL

## 2024-06-24 PROCEDURE — 76821 MIDDLE CEREBRAL ARTERY ECHO: CPT | Mod: 59

## 2024-06-24 PROCEDURE — 76816 OB US FOLLOW-UP PER FETUS: CPT

## 2024-06-26 ENCOUNTER — NON-APPOINTMENT (OUTPATIENT)
Age: 29
End: 2024-06-26

## 2024-07-15 ENCOUNTER — NON-APPOINTMENT (OUTPATIENT)
Age: 29
End: 2024-07-15

## 2024-07-15 ENCOUNTER — APPOINTMENT (OUTPATIENT)
Dept: ANTEPARTUM | Facility: CLINIC | Age: 29
End: 2024-07-15
Payer: COMMERCIAL

## 2024-07-15 ENCOUNTER — APPOINTMENT (OUTPATIENT)
Dept: OBGYN | Facility: CLINIC | Age: 29
End: 2024-07-15
Payer: COMMERCIAL

## 2024-07-15 ENCOUNTER — ASOB RESULT (OUTPATIENT)
Age: 29
End: 2024-07-15

## 2024-07-15 VITALS — SYSTOLIC BLOOD PRESSURE: 110 MMHG | DIASTOLIC BLOOD PRESSURE: 74 MMHG | WEIGHT: 159 LBS | BODY MASS INDEX: 24.54 KG/M2

## 2024-07-15 PROCEDURE — 0502F SUBSEQUENT PRENATAL CARE: CPT

## 2024-07-15 PROCEDURE — 76816 OB US FOLLOW-UP PER FETUS: CPT

## 2024-07-18 ENCOUNTER — NON-APPOINTMENT (OUTPATIENT)
Age: 29
End: 2024-07-18

## 2024-08-05 ENCOUNTER — APPOINTMENT (OUTPATIENT)
Dept: ANTEPARTUM | Facility: CLINIC | Age: 29
End: 2024-08-05

## 2024-08-05 ENCOUNTER — ASOB RESULT (OUTPATIENT)
Age: 29
End: 2024-08-05

## 2024-08-05 PROCEDURE — 76816 OB US FOLLOW-UP PER FETUS: CPT

## 2024-08-05 PROCEDURE — 99212 OFFICE O/P EST SF 10 MIN: CPT | Mod: 25

## 2024-08-05 PROCEDURE — 76819 FETAL BIOPHYS PROFIL W/O NST: CPT

## 2024-08-08 ENCOUNTER — NON-APPOINTMENT (OUTPATIENT)
Age: 29
End: 2024-08-08

## 2024-08-08 ENCOUNTER — APPOINTMENT (OUTPATIENT)
Dept: OBGYN | Facility: CLINIC | Age: 29
End: 2024-08-08

## 2024-08-08 PROCEDURE — 90471 IMMUNIZATION ADMIN: CPT

## 2024-08-08 PROCEDURE — 0502F SUBSEQUENT PRENATAL CARE: CPT

## 2024-08-08 PROCEDURE — 90715 TDAP VACCINE 7 YRS/> IM: CPT

## 2024-08-12 ENCOUNTER — APPOINTMENT (OUTPATIENT)
Dept: ANTEPARTUM | Facility: CLINIC | Age: 29
End: 2024-08-12
Payer: COMMERCIAL

## 2024-08-12 ENCOUNTER — ASOB RESULT (OUTPATIENT)
Age: 29
End: 2024-08-12

## 2024-08-12 PROCEDURE — 76820 UMBILICAL ARTERY ECHO: CPT

## 2024-08-12 PROCEDURE — 76819 FETAL BIOPHYS PROFIL W/O NST: CPT

## 2024-08-15 ENCOUNTER — NON-APPOINTMENT (OUTPATIENT)
Age: 29
End: 2024-08-15

## 2024-08-15 ENCOUNTER — APPOINTMENT (OUTPATIENT)
Dept: OBGYN | Facility: CLINIC | Age: 29
End: 2024-08-15

## 2024-08-19 ENCOUNTER — APPOINTMENT (OUTPATIENT)
Dept: ANTEPARTUM | Facility: CLINIC | Age: 29
End: 2024-08-19

## 2024-08-19 ENCOUNTER — ASOB RESULT (OUTPATIENT)
Age: 29
End: 2024-08-19

## 2024-08-19 PROCEDURE — 76819 FETAL BIOPHYS PROFIL W/O NST: CPT

## 2024-08-19 PROCEDURE — 76820 UMBILICAL ARTERY ECHO: CPT

## 2024-08-21 ENCOUNTER — APPOINTMENT (OUTPATIENT)
Dept: ULTRASOUND IMAGING | Facility: CLINIC | Age: 29
End: 2024-08-21
Payer: COMMERCIAL

## 2024-08-21 PROCEDURE — 93970 EXTREMITY STUDY: CPT | Mod: 26

## 2024-08-22 ENCOUNTER — APPOINTMENT (OUTPATIENT)
Dept: OBGYN | Facility: CLINIC | Age: 29
End: 2024-08-22

## 2024-08-23 ENCOUNTER — TRANSCRIPTION ENCOUNTER (OUTPATIENT)
Age: 29
End: 2024-08-23

## 2024-08-26 ENCOUNTER — ASOB RESULT (OUTPATIENT)
Age: 29
End: 2024-08-26

## 2024-08-26 ENCOUNTER — APPOINTMENT (OUTPATIENT)
Dept: ANTEPARTUM | Facility: CLINIC | Age: 29
End: 2024-08-26
Payer: COMMERCIAL

## 2024-08-26 PROCEDURE — 76816 OB US FOLLOW-UP PER FETUS: CPT

## 2024-08-26 PROCEDURE — 76818 FETAL BIOPHYS PROFILE W/NST: CPT

## 2024-08-26 PROCEDURE — 76820 UMBILICAL ARTERY ECHO: CPT | Mod: 59

## 2024-08-29 ENCOUNTER — NON-APPOINTMENT (OUTPATIENT)
Age: 29
End: 2024-08-29

## 2024-08-29 ENCOUNTER — APPOINTMENT (OUTPATIENT)
Dept: OBGYN | Facility: CLINIC | Age: 29
End: 2024-08-29
Payer: COMMERCIAL

## 2024-08-29 VITALS — DIASTOLIC BLOOD PRESSURE: 60 MMHG | WEIGHT: 169 LBS | SYSTOLIC BLOOD PRESSURE: 100 MMHG | BODY MASS INDEX: 26.08 KG/M2

## 2024-08-29 PROCEDURE — 0502F SUBSEQUENT PRENATAL CARE: CPT

## 2024-08-30 LAB
GP B STREP DNA SPEC QL NAA+PROBE: NOT DETECTED
SOURCE GBS: NORMAL

## 2024-09-04 ENCOUNTER — APPOINTMENT (OUTPATIENT)
Dept: ANTEPARTUM | Facility: CLINIC | Age: 29
End: 2024-09-04
Payer: COMMERCIAL

## 2024-09-04 ENCOUNTER — ASOB RESULT (OUTPATIENT)
Age: 29
End: 2024-09-04

## 2024-09-04 PROCEDURE — 76818 FETAL BIOPHYS PROFILE W/NST: CPT

## 2024-09-04 PROCEDURE — 76820 UMBILICAL ARTERY ECHO: CPT

## 2024-09-05 ENCOUNTER — NON-APPOINTMENT (OUTPATIENT)
Age: 29
End: 2024-09-05

## 2024-09-05 ENCOUNTER — MED ADMIN CHARGE (OUTPATIENT)
Age: 29
End: 2024-09-05

## 2024-09-05 ENCOUNTER — APPOINTMENT (OUTPATIENT)
Dept: OBGYN | Facility: CLINIC | Age: 29
End: 2024-09-05
Payer: COMMERCIAL

## 2024-09-05 VITALS
SYSTOLIC BLOOD PRESSURE: 122 MMHG | HEIGHT: 67.5 IN | BODY MASS INDEX: 26.38 KG/M2 | DIASTOLIC BLOOD PRESSURE: 70 MMHG | WEIGHT: 170.05 LBS

## 2024-09-05 PROCEDURE — 0502F SUBSEQUENT PRENATAL CARE: CPT

## 2024-09-09 ENCOUNTER — ASOB RESULT (OUTPATIENT)
Age: 29
End: 2024-09-09

## 2024-09-09 ENCOUNTER — APPOINTMENT (OUTPATIENT)
Dept: ANTEPARTUM | Facility: CLINIC | Age: 29
End: 2024-09-09
Payer: COMMERCIAL

## 2024-09-09 PROCEDURE — 76816 OB US FOLLOW-UP PER FETUS: CPT

## 2024-09-09 PROCEDURE — 76819 FETAL BIOPHYS PROFIL W/O NST: CPT | Mod: 59

## 2024-09-11 ENCOUNTER — APPOINTMENT (OUTPATIENT)
Dept: ANTEPARTUM | Facility: CLINIC | Age: 29
End: 2024-09-11

## 2024-09-11 ENCOUNTER — NON-APPOINTMENT (OUTPATIENT)
Age: 29
End: 2024-09-11

## 2024-09-12 ENCOUNTER — APPOINTMENT (OUTPATIENT)
Dept: ANTEPARTUM | Facility: CLINIC | Age: 29
End: 2024-09-12

## 2024-09-12 ENCOUNTER — APPOINTMENT (OUTPATIENT)
Dept: OBGYN | Facility: CLINIC | Age: 29
End: 2024-09-12
Payer: COMMERCIAL

## 2024-09-12 VITALS — SYSTOLIC BLOOD PRESSURE: 102 MMHG | DIASTOLIC BLOOD PRESSURE: 64 MMHG | BODY MASS INDEX: 26.54 KG/M2 | WEIGHT: 172 LBS

## 2024-09-12 PROCEDURE — 0502F SUBSEQUENT PRENATAL CARE: CPT

## 2024-09-16 ENCOUNTER — NON-APPOINTMENT (OUTPATIENT)
Age: 29
End: 2024-09-16

## 2024-09-16 ENCOUNTER — INPATIENT (INPATIENT)
Facility: HOSPITAL | Age: 29
LOS: 2 days | Discharge: HOME | DRG: 951 | End: 2024-09-19
Attending: OBSTETRICS & GYNECOLOGY | Admitting: OBSTETRICS & GYNECOLOGY
Payer: COMMERCIAL

## 2024-09-16 DIAGNOSIS — Z34.93 ENCOUNTER FOR SUPERVISION OF NORMAL PREGNANCY, UNSPECIFIED, THIRD TRIMESTER: ICD-10-CM

## 2024-09-16 DIAGNOSIS — B34.3 PARVOVIRUS INFECTION, UNSPECIFIED: ICD-10-CM

## 2024-09-16 DIAGNOSIS — Z3A.38 38 WEEKS GESTATION OF PREGNANCY: ICD-10-CM

## 2024-09-16 LAB
BASOPHILS # BLD AUTO: 0.04 K/UL — SIGNIFICANT CHANGE UP (ref 0–0.2)
BASOPHILS NFR BLD AUTO: 0.4 % — SIGNIFICANT CHANGE UP (ref 0–2)
BLD GP AB SCN SERPL QL: NEGATIVE — SIGNIFICANT CHANGE UP
EOSINOPHIL # BLD AUTO: 0.2 K/UL — SIGNIFICANT CHANGE UP (ref 0–0.5)
EOSINOPHIL NFR BLD AUTO: 2 % — SIGNIFICANT CHANGE UP (ref 0–6)
HCT VFR BLD CALC: 31.2 % — LOW (ref 34.5–45)
HGB BLD-MCNC: 10.5 G/DL — LOW (ref 11.5–15.5)
IMM GRANULOCYTES NFR BLD AUTO: 0.7 % — SIGNIFICANT CHANGE UP (ref 0–0.9)
LYMPHOCYTES # BLD AUTO: 2.21 K/UL — SIGNIFICANT CHANGE UP (ref 1–3.3)
LYMPHOCYTES # BLD AUTO: 22.5 % — SIGNIFICANT CHANGE UP (ref 13–44)
MCHC RBC-ENTMCNC: 30.3 PG — SIGNIFICANT CHANGE UP (ref 27–34)
MCHC RBC-ENTMCNC: 33.7 GM/DL — SIGNIFICANT CHANGE UP (ref 32–36)
MCV RBC AUTO: 90.2 FL — SIGNIFICANT CHANGE UP (ref 80–100)
MONOCYTES # BLD AUTO: 0.82 K/UL — SIGNIFICANT CHANGE UP (ref 0–0.9)
MONOCYTES NFR BLD AUTO: 8.4 % — SIGNIFICANT CHANGE UP (ref 2–14)
NEUTROPHILS # BLD AUTO: 6.47 K/UL — SIGNIFICANT CHANGE UP (ref 1.8–7.4)
NEUTROPHILS NFR BLD AUTO: 66 % — SIGNIFICANT CHANGE UP (ref 43–77)
NRBC # BLD: 0 /100 WBCS — SIGNIFICANT CHANGE UP (ref 0–0)
PLATELET # BLD AUTO: 236 K/UL — SIGNIFICANT CHANGE UP (ref 150–400)
RBC # BLD: 3.46 M/UL — LOW (ref 3.8–5.2)
RBC # FLD: 13.5 % — SIGNIFICANT CHANGE UP (ref 10.3–14.5)
RH IG SCN BLD-IMP: POSITIVE — SIGNIFICANT CHANGE UP
WBC # BLD: 9.81 K/UL — SIGNIFICANT CHANGE UP (ref 3.8–10.5)
WBC # FLD AUTO: 9.81 K/UL — SIGNIFICANT CHANGE UP (ref 3.8–10.5)

## 2024-09-16 RX ORDER — OXYTOCIN 10 UNIT/ML
167 AMPUL (ML) INJECTION
Qty: 30 | Refills: 0 | Status: COMPLETED | OUTPATIENT
Start: 2024-09-16 | End: 2024-09-16

## 2024-09-16 RX ORDER — CHLORHEXIDINE GLUCONATE 40 MG/ML
1 SOLUTION TOPICAL DAILY
Refills: 0 | Status: DISCONTINUED | OUTPATIENT
Start: 2024-09-16 | End: 2024-09-18

## 2024-09-16 RX ORDER — SODIUM CITRATE AND CITRIC ACID MONOHYDRATE 334; 500 MG/5ML; MG/5ML
15 SOLUTION ORAL EVERY 6 HOURS
Refills: 0 | Status: DISCONTINUED | OUTPATIENT
Start: 2024-09-16 | End: 2024-09-18

## 2024-09-16 RX ADMIN — Medication 125 MILLILITER(S): at 19:20

## 2024-09-16 RX ADMIN — Medication 125 MILLILITER(S): at 20:05

## 2024-09-16 NOTE — OB PROVIDER H&P - PRO PRENATAL LABS ORI SOURCE BLOOD TYPE
[facility].     Radhika Causey MD  Emergency General Surgery  [phone] ([phone])  [phone] hard copy, drawn during this pregnancy

## 2024-09-16 NOTE — OB PROVIDER H&P - ATTENDING COMMENTS
OBGYN attending Note: Agree with above  27 y/o P1 at 38 wks 6 days, this pregnancy was complicated by parvovirus infection, fetal anemia, and fetal transfusion/ PUBS at 18wks EGA. + IUGR that improved. Patient admitted for elective induction of labor and high risk antepartum course.  FHR 140s, Cateog[place] 1 tracing   Pelvic exam Cx long/closed/posterior  EFW 3250gms  A/P Admit to LD for hx of parvovirus infection, fetal anemia, s/p PUBS, IUGR that improved, elective induction of labor  EFM, [place], IVF, labs  LD consent in chart, kalee larry accept a blood transfusion if needed  Buccal cytotec to begin after 12 AM at 39wks EGA  Continue to monitor  Yaquelin Astorga MD

## 2024-09-16 NOTE — OB PROVIDER H&P - HISTORY OF PRESENT ILLNESS
PA Admission H&P    Subjective  HPI: 28F  38.6wks gestational age presents for a scheduled elective IOL. +FM. -LOF. -CTXs. -VB. Pt denies any chest pain, palpitations, SOB, or any other concerns.     Of note, pt contracted parvovirus at 18 weeks gestation during this pregnancy, now s/p fetal transfusion x1 via percutaneous umbilical cord blood sampling (PUBS). Pt also found to be IUGR on  in the setting of AC at 7%, with subsequent resolution. Most recent sono on  reveals fetal size appropriate for gestational age, AC within normal limits.     – PNC: See above. GBS negative. EFW 3250g extrapolated from sono in office 1 week ago.   – Fetal alert: 24 - Parvo at 15 weeks gestation with fetal anemia.  S/P PUBS transfusion at 18 weeks gestation. -Tatyana Mays, JOVANNIC  – OBHx:  2016, FT, 8lb 4oz without complications. Denies terminations, miscarriages, ectopic pregnancies.   – GynHx: Denies fibroids, ovarian cysts, abnormal pap smears, STI  – PMH: Childhood eczema, now resolved. Denies asthma, thyroid disorders, renal/liver disease, bleeding/clotting disorders.   – PSH: Denies  – Psych: Denies anxiety, depression  – Social: Denies T/E/D  – Meds: PNV  – Allergies: NKDA  – Will accept blood transfusions? Yes

## 2024-09-16 NOTE — OB RN PATIENT PROFILE - NSMATERNALFETALCONCERNS_OBGYN_ALL_OB_FT
Fetal Alert  6/13/24 - Parvo at 15 weeks gestation with fetal anemia.  S/P PUBS transfusion at 18 weeks gestation. -Tatyana Mays, RNC

## 2024-09-16 NOTE — OB PROVIDER H&P - ASSESSMENT
Assessment  28F  38.6wks gestational age admitted for an elective IOL for induction to be started at midnight.

## 2024-09-16 NOTE — OB PROVIDER H&P - NSLOWPPHRISK_OBGYN_A_OB
No previous uterine incision/[place] Pregnancy/Less than or equal to 4 previous vaginal births/No known bleeding disorder

## 2024-09-16 NOTE — OB RN PATIENT PROFILE - NS_PARA_OBGYN_ALL_OB_NU
Tetracycline Counseling: Patient counseled regarding possible photosensitivity and increased risk for sunburn.  Patient instructed to avoid sunlight, if possible.  When exposed to sunlight, patients should wear protective clothing, sunglasses, and sunscreen.  The patient was instructed to call the office immediately if the following severe adverse effects occur:  hearing changes, easy bruising/bleeding, severe headache, or vision changes.  The patient verbalized understanding of the proper use and possible adverse effects of tetracycline.  All of the patient's questions and concerns were addressed. Patient understands to avoid pregnancy while on therapy due to potential birth defects. 1

## 2024-09-16 NOTE — OB PROVIDER H&P - NSHPPHYSICALEXAM_GEN_ALL_CORE
Objective  – VS  T(C): 36.8 (09-16-24 @ 19:05)  HR: 74 (09-16-24 @ 19:51)  BP: 113/61 (09-16-24 @ 19:05)  RR: 18 (09-16-24 @ 19:05)  SpO2: 98% (09-16-24 @ 19:51)  – PE:   General: NAD  CV: RRR  Pulm: breathing comfortably on RA, clear to auscultation b/l  Abd: gravid, nontender  Extr: moving all extremities with ease, nonedematous, non-TTP of b/l LE  – VE: 0/0/-3  – FHT: 125/moderate variability/+accels/-decels  – [place]: infrequent contractions  – EFW: 3250g extrapolated from sono in office 1 week ago  – Sono: vertex

## 2024-09-16 NOTE — OB PROVIDER H&P - PROBLEM SELECTOR PLAN 1
Plan  1. Admit to L&D. Routine Labs. IVF.  2. IOL with buccal cytotec after midnight  3. Fetus: cat 1 tracing. VTX. EFW 3250g extrapolated from sono in office 1 week ago. Continuous EFM. Sono. No concerns.  4. Prenatal issues: Parvovirus at 18 weeks gestation during this pregnancy, now s/p fetal transfusion x1 via percutaneous umbilical cord blood sampling (PUBS). Pt also found to be IUGR on 8/26 in the setting of AC at 7%, with subsequent resolution. Most recent sono on 9/9 reveals fetal size appropriate for gestational age, AC within normal limits  5. GBS negative  6. Pain: IV pain meds/epidural PRN    Discussed with Dr. Rizwan French PA-C

## 2024-09-16 NOTE — OB PROVIDER H&P - NS_OBGYNHISTORY_OBGYN_ALL_OB_FT
Of note, pt contracted parvovirus at 18 weeks gestation during this pregnancy, now s/p fetal transfusion x1 via percutaneous umbilical cord blood sampling (PUBS). Pt also found to be IUGR on  in the setting of AC at 7%, with subsequent resolution. Most recent sono on  reveals fetal size appropriate for gestational age, AC within normal limits.     – PNC: See above. GBS negative. EFW 3250g extrapolated from sono in office 1 week ago.   – Fetal alert: 24 - Parvo at 15 weeks gestation with fetal anemia.  S/P PUBS transfusion at 18 weeks gestation. -Tatyana Mays, RNC  – OBHx:  2016, FT, 8lb 4oz without complications. Denies terminations, miscarriages, ectopic pregnancies.

## 2024-09-16 NOTE — OB RN PATIENT PROFILE - FALL HARM RISK - UNIVERSAL INTERVENTIONS
Bed in lowest position, wheels locked, appropriate side rails in place/Call bell, personal items and telephone in reach/Instruct patient to call for assistance before getting out of bed or chair/Non-slip footwear when patient is out of bed/[place] to call system/Physically safe environment - no spills, clutter or unnecessary equipment/Purposeful Proactive Rounding/Room/bathroom lighting operational, light cord in reach

## 2024-09-17 PROBLEM — Z78.9 OTHER SPECIFIED HEALTH STATUS: Chronic | Status: INACTIVE | Noted: 2024-04-26 | Resolved: 2024-09-16

## 2024-09-17 LAB — T PALLIDUM AB TITR SER: NEGATIVE — SIGNIFICANT CHANGE UP

## 2024-09-17 PROCEDURE — 59400 OBSTETRICAL CARE: CPT

## 2024-09-17 RX ORDER — OXYTOCIN 10 UNIT/ML
666 AMPUL (ML) INJECTION
Qty: 30 | Refills: 0 | Status: DISCONTINUED | OUTPATIENT
Start: 2024-09-17 | End: 2024-09-19

## 2024-09-17 RX ORDER — OXYTOCIN 10 UNIT/ML
4 AMPUL (ML) INJECTION
Qty: 30 | Refills: 0 | Status: DISCONTINUED | OUTPATIENT
Start: 2024-09-17 | End: 2024-09-17

## 2024-09-17 RX ORDER — LANOLIN
1 OINTMENT (GRAM) TOPICAL EVERY 6 HOURS
Refills: 0 | Status: DISCONTINUED | OUTPATIENT
Start: 2024-09-17 | End: 2024-09-19

## 2024-09-17 RX ORDER — VITAMIN A, ASCORBIC ACID, VITAMIN D, .ALPHA.-TOCOPHEROL, THIAMINE MONONITRATE, RIBOFLAVIN, NIACIN, PYRIDOXINE HYDROCHLORIDE, FOLIC ACID, CYANOCOBALAMIN, CALCIUM, IRON, MAGNESIUM, ZINC, AND COPPER 2700; 70; 400; 30; 1.6; 1.8; 18; 2.5; 1; 12; 100; 65; 25; 25; 2 [IU]/1; MG/1; [IU]/1; [IU]/1; MG/1; MG/1; MG/1; MG/1; MG/1; UG/1; MG/1; MG/1; MG/1; MG/1; MG/1
1 TABLET ORAL DAILY
Refills: 0 | Status: DISCONTINUED | OUTPATIENT
Start: 2024-09-17 | End: 2024-09-19

## 2024-09-17 RX ORDER — SODIUM CHLORIDE 9 MG/ML
3 INJECTION INTRAMUSCULAR; INTRAVENOUS; SUBCUTANEOUS EVERY 8 HOURS
Refills: 0 | Status: DISCONTINUED | OUTPATIENT
Start: 2024-09-17 | End: 2024-09-19

## 2024-09-17 RX ORDER — HYDROCORTISONE 1 %
1 OINTMENT (GRAM) TOPICAL EVERY 6 HOURS
Refills: 0 | Status: DISCONTINUED | OUTPATIENT
Start: 2024-09-17 | End: 2024-09-19

## 2024-09-17 RX ORDER — SODIUM CHLORIDE 9 MG/ML
300 INJECTION INTRAMUSCULAR; INTRAVENOUS; SUBCUTANEOUS ONCE
Refills: 0 | Status: DISCONTINUED | OUTPATIENT
Start: 2024-09-17 | End: 2024-09-17

## 2024-09-17 RX ORDER — IBUPROFEN 600 MG
600 TABLET ORAL EVERY 6 HOURS
Refills: 0 | Status: COMPLETED | OUTPATIENT
Start: 2024-09-17 | End: 2025-08-16

## 2024-09-17 RX ORDER — OXYTOCIN 10 UNIT/ML
2 AMPUL (ML) INJECTION
Qty: 30 | Refills: 0 | Status: DISCONTINUED | OUTPATIENT
Start: 2024-09-17 | End: 2024-09-19

## 2024-09-17 RX ORDER — ACETAMINOPHEN 325 MG/1
975 TABLET ORAL
Refills: 0 | Status: DISCONTINUED | OUTPATIENT
Start: 2024-09-17 | End: 2024-09-19

## 2024-09-17 RX ORDER — WITCH HAZEL 1 G/ML
1 LIQUID TOPICAL EVERY 4 HOURS
Refills: 0 | Status: DISCONTINUED | OUTPATIENT
Start: 2024-09-17 | End: 2024-09-19

## 2024-09-17 RX ORDER — OXYCODONE HYDROCHLORIDE 5 MG/1
5 TABLET ORAL
Refills: 0 | Status: DISCONTINUED | OUTPATIENT
Start: 2024-09-17 | End: 2024-09-19

## 2024-09-17 RX ORDER — KETOROLAC TROMETHAMINE 30 MG/ML
30 INJECTION, SOLUTION INTRAMUSCULAR ONCE
Refills: 0 | Status: DISCONTINUED | OUTPATIENT
Start: 2024-09-17 | End: 2024-09-17

## 2024-09-17 RX ORDER — MENTHOL/CETYLPYRD CL 3 MG
1 LOZENGE MUCOUS MEMBRANE EVERY 6 HOURS
Refills: 0 | Status: DISCONTINUED | OUTPATIENT
Start: 2024-09-17 | End: 2024-09-19

## 2024-09-17 RX ORDER — TETANUS TOXOID, REDUCED DIPHTHERIA TOXOID AND ACELLULAR PERTUSSIS VACCINE, ADSORBED 5; 2.5; 8; 8; 2.5 [IU]/.5ML; [IU]/.5ML; UG/.5ML; UG/.5ML; UG/.5ML
0.5 SUSPENSION INTRAMUSCULAR ONCE
Refills: 0 | Status: DISCONTINUED | OUTPATIENT
Start: 2024-09-17 | End: 2024-09-19

## 2024-09-17 RX ORDER — OXYCODONE HYDROCHLORIDE 5 MG/1
5 TABLET ORAL ONCE
Refills: 0 | Status: DISCONTINUED | OUTPATIENT
Start: 2024-09-17 | End: 2024-09-19

## 2024-09-17 RX ORDER — SODIUM CHLORIDE 9 MG/ML
1000 INJECTION INTRAMUSCULAR; INTRAVENOUS; SUBCUTANEOUS
Refills: 0 | Status: DISCONTINUED | OUTPATIENT
Start: 2024-09-17 | End: 2024-09-17

## 2024-09-17 RX ORDER — DIPHENHYDRAMINE HCL 50 MG
25 CAPSULE ORAL EVERY 6 HOURS
Refills: 0 | Status: DISCONTINUED | OUTPATIENT
Start: 2024-09-17 | End: 2024-09-19

## 2024-09-17 RX ORDER — PRAMOXINE HCL 1 %
1 GEL (GRAM) TOPICAL EVERY 4 HOURS
Refills: 0 | Status: DISCONTINUED | OUTPATIENT
Start: 2024-09-17 | End: 2024-09-19

## 2024-09-17 RX ADMIN — Medication 2 MILLIUNIT(S)/MIN: at 21:28

## 2024-09-17 RX ADMIN — Medication 1000 MILLILITER(S): at 20:25

## 2024-09-17 RX ADMIN — KETOROLAC TROMETHAMINE 30 MILLIGRAM(S): 30 INJECTION, SOLUTION INTRAMUSCULAR at 23:22

## 2024-09-17 RX ADMIN — Medication 125 MILLILITER(S): at 20:25

## 2024-09-17 RX ADMIN — Medication 167 MILLIUNIT(S)/MIN: at 22:19

## 2024-09-17 NOTE — OB PROVIDER LABOR PROGRESS NOTE - ASSESSMENT
28F  at 39wks gestational age admitted for IOL for fetal anemia secondary to parvovirus s/p fetal transfusions x1.    -No cervical changes noted  -IUPC and AI placement discussed with pt, pt verbalized understanding and agreement to plan  -IUPC placed without complications  -Lateral repositioning for resuscitative measures  -Amnioinfusion to be started if variable decelerations continue to persist  -Begin Pitocin when tracing improves  -Continue monitoring EFM/toco    Discussed with Dr. Gilson French PA-C

## 2024-09-17 NOTE — OB PROVIDER LABOR PROGRESS NOTE - ASSESSMENT
27yo P1  @ 39 wks admitted for IOL for fetal anemia 2nd to parvo virus s/p fetal transfusions. Now S/P CB.    -Cat I tracing, regular ctxs.  -Continue with last dose of BC due to thick cervix.  -For pitocin. D/C BC and start pitocin if patient SROMs.  -Continue maternal/fetal monitoring.    D/W Dr. Gilson Mueller MD PGY1

## 2024-09-17 NOTE — OB PROVIDER DELIVERY SUMMARY - NSPROVIDERDELIVERYNOTE_OBGYN_ALL_OB_FT
Pt found to be fully dilated with urge to push,  of viable female infant. Head, shoulders and body delivered easily in OA position.  Cord clamping was delayed and then cut uncomplicated. Placenta delivered intact. Vaginal exam revealed intact cervix, vaginal walls, sulci. Fundal exam performed, with minimal clot evacuated. Fundus and lower uterine segment firm. Excellent hemostasis.  Kemal Christie PGY3

## 2024-09-17 NOTE — OB PROVIDER DELIVERY SUMMARY - NSSELHIDDEN_OBGYN_ALL_OB_FT
[NS_DeliveryAttending1_OBGYN_ALL_OB_FT:[id number]],[NS_DeliveryAssist1_OBGYN_ALL_OB_FT:[id number]]

## 2024-09-17 NOTE — OB PROVIDER LABOR PROGRESS NOTE - ASSESSMENT
27yo P1  @ 39 wks admitted for IOL for fetal anemia 2nd to parvo virus s/p fetal transfusions  currently w cytotec and cervical balloon  tug on balloon, no movement    Consent signed  plan vasectomy for BC but if C/S desire tubal  bpt has used IUD in past  advised of permanence of sterilization, no reversal procedures for vasectomy and salpingectomy    accepts blood if indicated  discussed episiotomy, vaccuum and  indication    Discussed IOL 24-72 hours to set expectations    ISACC Riggins MD  attending

## 2024-09-17 NOTE — OB PROVIDER LABOR PROGRESS NOTE - NS_OBIHIFHRDETAILS_OBGYN_ALL_OB_FT
120, moderate variability, + accels, no decels
130/moderate variability/+accels/no decels
125bpm, mod variability, +accels, no decels
125bpm, mod variability, +accels, variable decels

## 2024-09-17 NOTE — OB RN DELIVERY SUMMARY - NS_SEPSISRSKCALC_OBGYN_ALL_OB_FT
EOS calculated successfully. EOS Risk Factor: 0.5/1000 live births ([facility] national incidence); GA=39w;Temp=98.78; ROM=1.817; GBS='Negative'; Antibiotics='No antibiotics or any antibiotics < 2 hrs prior to birth'

## 2024-09-17 NOTE — OB PROVIDER LABOR PROGRESS NOTE - ASSESSMENT
27yo P1  @ 39 wks admitted for IOL for fetal anemia 2nd to parvo virus s/p fetal transfusions. Now SROM clear fluid with blood streaking at 830p.    -Start Pitocin 2x2.  -Use peanut ball to all for fetal head descent.  -Continue maternal/fetal monitoring.    D/W Dr. Gilson Mueller MD PGY1   29yo P1  @ 39 wks admitted for IOL for fetal anemia 2nd to parvo virus s/p fetal transfusions. Now SROM clear fluid with blood streaking at 830p.    -Cat II tracing, likely due to recent SROM. reassuring with mod josé and accels  -Start Pitocin 2x2.  -Use peanut ball to all for fetal head descent.  -Continue maternal/fetal monitoring.    D/W Dr. Gilson Mueller MD PGY1

## 2024-09-17 NOTE — OB RN DELIVERY SUMMARY - NSSELHIDDEN_OBGYN_ALL_OB_FT
[NS_DeliveryAttending1_OBGYN_ALL_OB_FT:[id number]],[NS_DeliveryAssist1_OBGYN_ALL_OB_FT:[id number]],[NS_DeliveryRN_OBGYN_ALL_OB_FT:[id number]]

## 2024-09-17 NOTE — OB PROVIDER LABOR PROGRESS NOTE - NS_SUBJECTIVE/OBJECTIVE_OBGYN_ALL_OB_FT
Called by RN. Patient feeling some leaking. Patient laying comfortably in bed. Intermittent variables on FHT.
back note ~ 1 pm    delay 2nd to patient care, labor floor  balloon in place    ICU Vital Signs Last 24 Hrs  T(C): 37.1 (17 Sep 2024 09:47), Max: 37.1 (17 Sep 2024 05:31)  T(F): 98.78 (17 Sep 2024 09:47), Max: 98.78 (17 Sep 2024 05:31)  HR: 59 (17 Sep 2024 13:52) (49 - 85)  BP: 97/56 (17 Sep 2024 13:43) (89/55 - 116/62)  RR: 16 (17 Sep 2024 09:11) (16 - 18)  SpO2: 99% (17 Sep 2024 13:52) (91% - 100%)    O2 Parameters below as of 16 Sep 2024 19:05  Patient On (Oxygen Delivery Method): room air                            10.5   9.81  )-----------( 236      ( 16 Sep 2024 19:31 )             31.2
Pt evaluated at bedside for recurrent variable decelerations after SROM. Pt comfortable in bed with intermittent rectal pressure.    ICU Vital Signs Last 24 Hrs  T(C): 36.6 (17 Sep 2024 19:36), Max: 37.1 (17 Sep 2024 05:31)  T(F): 97.88 (17 Sep 2024 19:36), Max: 98.78 (17 Sep 2024 05:31)  HR: 58 (17 Sep 2024 21:08) (49 - 85)  BP: 111/59 (17 Sep 2024 21:06) (89/55 - 135/58)  RR: 16 (17 Sep 2024 09:11) (16 - 18)  SpO2: 99% (17 Sep 2024 21:08) (89% - 100%)
Called by RN. Cervical Balloon out. Patient resting comfortably in bed.
PA Note:  Patient seen and evaluated at bedside for placement of cervical balloon. Patient comfortable.    Cervical balloon placed without incident. 60cc of NS filled each uterine and vaginal balloon.

## 2024-09-18 RX ORDER — IBUPROFEN 600 MG
600 TABLET ORAL EVERY 6 HOURS
Refills: 0 | Status: DISCONTINUED | OUTPATIENT
Start: 2024-09-18 | End: 2024-09-19

## 2024-09-18 RX ADMIN — OXYCODONE HYDROCHLORIDE 5 MILLIGRAM(S): 5 TABLET ORAL at 21:25

## 2024-09-18 RX ADMIN — Medication 600 MILLIGRAM(S): at 11:44

## 2024-09-18 RX ADMIN — ACETAMINOPHEN 975 MILLIGRAM(S): 325 TABLET ORAL at 08:26

## 2024-09-18 RX ADMIN — Medication 600 MILLIGRAM(S): at 12:14

## 2024-09-18 RX ADMIN — Medication 600 MILLIGRAM(S): at 18:55

## 2024-09-18 RX ADMIN — Medication 600 MILLIGRAM(S): at 06:33

## 2024-09-18 RX ADMIN — ACETAMINOPHEN 975 MILLIGRAM(S): 325 TABLET ORAL at 15:20

## 2024-09-18 RX ADMIN — OXYCODONE HYDROCHLORIDE 5 MILLIGRAM(S): 5 TABLET ORAL at 20:49

## 2024-09-18 RX ADMIN — ACETAMINOPHEN 975 MILLIGRAM(S): 325 TABLET ORAL at 20:49

## 2024-09-18 RX ADMIN — Medication 600 MILLIGRAM(S): at 07:03

## 2024-09-18 RX ADMIN — Medication 600 MILLIGRAM(S): at 18:25

## 2024-09-18 RX ADMIN — ACETAMINOPHEN 975 MILLIGRAM(S): 325 TABLET ORAL at 15:50

## 2024-09-18 RX ADMIN — ACETAMINOPHEN 975 MILLIGRAM(S): 325 TABLET ORAL at 03:27

## 2024-09-18 RX ADMIN — VITAMIN A, ASCORBIC ACID, VITAMIN D, .ALPHA.-TOCOPHEROL, THIAMINE MONONITRATE, RIBOFLAVIN, NIACIN, PYRIDOXINE HYDROCHLORIDE, FOLIC ACID, CYANOCOBALAMIN, CALCIUM, IRON, MAGNESIUM, ZINC, AND COPPER 1 TABLET(S): 2700; 70; 400; 30; 1.6; 1.8; 18; 2.5; 1; 12; 100; 65; 25; 25; 2 TABLET ORAL at 11:43

## 2024-09-18 RX ADMIN — ACETAMINOPHEN 975 MILLIGRAM(S): 325 TABLET ORAL at 21:25

## 2024-09-18 RX ADMIN — ACETAMINOPHEN 975 MILLIGRAM(S): 325 TABLET ORAL at 08:56

## 2024-09-18 NOTE — PROGRESS NOTE ADULT - PROBLEM SELECTOR PLAN 1
Increase OOB  Regular diet  PO Pain protocol  Continue routine pp care Increase OOB  Regular diet  PO Pain protocol  Continue routine pp care      ATTG:  Pt seen and evaluated  Stable PPD#1 s/p   Routine PP Care  D/C planning for tomorrow

## 2024-09-18 NOTE — PROGRESS NOTE ADULT - SUBJECTIVE AND OBJECTIVE BOX
Postpartum Note- PPD#1    Allergies: No Known Allergies    Blood type: O positive  Rubella: Immune  RPR: Negative      S: Patient is a 28y  PPD#1 s/p .     Patient w/o complaints, pain is controlled.    Pt is OOB, tolerating PO, voiding and passing flatus. Lochia WNL.     O:  Vital Signs Last 24 Hrs  T(C): 36.7 (18 Sep 2024 06:03), Max: 37.1 (17 Sep 2024 09:11)  T(F): 98 (18 Sep 2024 06:03), Max: 98.78 (17 Sep 2024 09:47)  HR: 65 (18 Sep 2024 06:03) (49 - 88)  BP: 97/61 (18 Sep 2024 06:03) (89/55 - 135/58)  BP(mean): 76 (18 Sep 2024 02:30) (66 - 76)  RR: 18 (18 Sep 2024 06:03) (16 - 18)  SpO2: 98% (18 Sep 2024 06:03) (87% - 100%)    Parameters below as of 18 Sep 2024 06:03  Patient On (Oxygen Delivery Method): room air      Gen: NAD  Abdomen: Soft, nontender, non-distended, fundus firm.  Lochia WNL  Ext: Neg edema, Neg calf tenderness    LABS:  Hemoglobin: 10.5 g/dL (24 @ 19:31)  Hematocrit: 31.2 % (24 @ 19:31)      A/P:  28y PPD#1 s/p , doing well.     PMHx: Denies  Current Issues: None    Increase OOB  Regular diet  PO Pain protocol  Continue routine pp care    Sophie Ramires PA-C

## 2024-09-19 ENCOUNTER — APPOINTMENT (OUTPATIENT)
Dept: OBGYN | Facility: CLINIC | Age: 29
End: 2024-09-19

## 2024-09-19 ENCOUNTER — TRANSCRIPTION ENCOUNTER (OUTPATIENT)
Age: 29
End: 2024-09-19

## 2024-09-19 VITALS
DIASTOLIC BLOOD PRESSURE: 68 MMHG | TEMPERATURE: 98 F | HEART RATE: 62 BPM | OXYGEN SATURATION: 98 % | RESPIRATION RATE: 18 BRPM | SYSTOLIC BLOOD PRESSURE: 106 MMHG

## 2024-09-19 PROCEDURE — 86780 TREPONEMA PALLIDUM: CPT

## 2024-09-19 PROCEDURE — 59050 FETAL MONITOR W/REPORT: CPT

## 2024-09-19 PROCEDURE — 85025 COMPLETE CBC W/AUTO DIFF WBC: CPT

## 2024-09-19 PROCEDURE — 86900 BLOOD TYPING SEROLOGIC ABO: CPT

## 2024-09-19 PROCEDURE — 86901 BLOOD TYPING SEROLOGIC RH(D): CPT

## 2024-09-19 PROCEDURE — 86850 RBC ANTIBODY SCREEN: CPT

## 2024-09-19 RX ORDER — IBUPROFEN 600 MG
1 TABLET ORAL
Qty: 0 | Refills: 0 | DISCHARGE
Start: 2024-09-19

## 2024-09-19 RX ORDER — ACETAMINOPHEN 325 MG/1
3 TABLET ORAL
Qty: 0 | Refills: 0 | DISCHARGE
Start: 2024-09-19

## 2024-09-19 RX ORDER — VITAMIN A, ASCORBIC ACID, VITAMIN D, .ALPHA.-TOCOPHEROL, THIAMINE MONONITRATE, RIBOFLAVIN, NIACIN, PYRIDOXINE HYDROCHLORIDE, FOLIC ACID, CYANOCOBALAMIN, CALCIUM, IRON, MAGNESIUM, ZINC, AND COPPER 2700; 70; 400; 30; 1.6; 1.8; 18; 2.5; 1; 12; 100; 65; 25; 25; 2 [IU]/1; MG/1; [IU]/1; [IU]/1; MG/1; MG/1; MG/1; MG/1; MG/1; UG/1; MG/1; MG/1; MG/1; MG/1; MG/1
1 TABLET ORAL
Refills: 0 | DISCHARGE

## 2024-09-19 RX ADMIN — ACETAMINOPHEN 975 MILLIGRAM(S): 325 TABLET ORAL at 08:34

## 2024-09-19 RX ADMIN — ACETAMINOPHEN 975 MILLIGRAM(S): 325 TABLET ORAL at 04:10

## 2024-09-19 RX ADMIN — Medication 600 MILLIGRAM(S): at 06:27

## 2024-09-19 RX ADMIN — Medication 600 MILLIGRAM(S): at 00:50

## 2024-09-19 RX ADMIN — Medication 600 MILLIGRAM(S): at 13:34

## 2024-09-19 RX ADMIN — ACETAMINOPHEN 975 MILLIGRAM(S): 325 TABLET ORAL at 09:24

## 2024-09-19 RX ADMIN — ACETAMINOPHEN 975 MILLIGRAM(S): 325 TABLET ORAL at 14:51

## 2024-09-19 RX ADMIN — ACETAMINOPHEN 975 MILLIGRAM(S): 325 TABLET ORAL at 03:34

## 2024-09-19 RX ADMIN — Medication 600 MILLIGRAM(S): at 14:30

## 2024-09-19 RX ADMIN — Medication 600 MILLIGRAM(S): at 00:12

## 2024-09-19 RX ADMIN — VITAMIN A, ASCORBIC ACID, VITAMIN D, .ALPHA.-TOCOPHEROL, THIAMINE MONONITRATE, RIBOFLAVIN, NIACIN, PYRIDOXINE HYDROCHLORIDE, FOLIC ACID, CYANOCOBALAMIN, CALCIUM, IRON, MAGNESIUM, ZINC, AND COPPER 1 TABLET(S): 2700; 70; 400; 30; 1.6; 1.8; 18; 2.5; 1; 12; 100; 65; 25; 25; 2 TABLET ORAL at 13:34

## 2024-09-19 RX ADMIN — ACETAMINOPHEN 975 MILLIGRAM(S): 325 TABLET ORAL at 15:50

## 2024-09-19 NOTE — DISCHARGE NOTE OB - PATIENT PORTAL LINK FT
You can access the FollowMyHealth Patient Portal offered by [facility] by registering at the following website: [email or web address]. By joining [facility]’s FollowMyHealth portal, you will also be able to view your health information using other applications (apps) compatible with our system.

## 2024-09-19 NOTE — DISCHARGE NOTE OB - CARE PROVIDER_API CALL
Yaquelin Astorga  Obstetrics and Gynecology  [address], [address]  [place] [address]  Phone: [phone]  Fax: [phone]  Follow Up Time:

## 2024-09-19 NOTE — DISCHARGE NOTE OB - MEDICATION SUMMARY - MEDICATIONS TO TAKE
I will START or STAY ON the medications listed below when I get home from the hospital:    ibuprofen 600 mg oral tablet  -- 1 tab(s) by mouth every 6 hours  -- Indication: For Normal spontaneous vaginal delivery    acetaminophen 325 mg oral tablet  -- 3 tab(s) by mouth 4 times a day  -- Indication: For Normal spontaneous vaginal delivery

## 2024-09-19 NOTE — DISCHARGE NOTE OB - CARE PLAN
Principal Discharge DX:	Vaginal delivery  Assessment and plan of treatment:	establishment of breast feeding  Office visit in 4 weeks   1

## 2024-09-19 NOTE — DISCHARGE NOTE OB - HOSPITAL COURSE
Patient admitted and underwent a vaginal delivery  normal post partum course   Cleared for discharge.

## 2024-09-23 ENCOUNTER — APPOINTMENT (OUTPATIENT)
Dept: ANTEPARTUM | Facility: CLINIC | Age: 29
End: 2024-09-23

## 2024-09-27 PROBLEM — Z86.19 PERSONAL HISTORY OF OTHER INFECTIOUS AND PARASITIC DISEASES: Chronic | Status: ACTIVE | Noted: 2024-09-16

## 2024-09-27 PROBLEM — Z87.2 PERSONAL HISTORY OF DISEASES OF THE SKIN AND SUBCUTANEOUS TISSUE: Chronic | Status: ACTIVE | Noted: 2024-09-16

## 2024-10-18 ENCOUNTER — APPOINTMENT (OUTPATIENT)
Dept: OBGYN | Facility: CLINIC | Age: 29
End: 2024-10-18
Payer: COMMERCIAL

## 2024-10-18 VITALS
BODY MASS INDEX: 24.05 KG/M2 | HEIGHT: 67.5 IN | WEIGHT: 155 LBS | SYSTOLIC BLOOD PRESSURE: 104 MMHG | DIASTOLIC BLOOD PRESSURE: 74 MMHG

## 2024-10-18 PROCEDURE — 0503F POSTPARTUM CARE VISIT: CPT

## 2024-11-12 ENCOUNTER — APPOINTMENT (OUTPATIENT)
Dept: OBGYN | Facility: CLINIC | Age: 29
End: 2024-11-12
Payer: COMMERCIAL

## 2024-11-12 VITALS — SYSTOLIC BLOOD PRESSURE: 107 MMHG | HEIGHT: 67.5 IN | DIASTOLIC BLOOD PRESSURE: 74 MMHG

## 2024-11-12 DIAGNOSIS — Z30.430 ENCOUNTER FOR INSERTION OF INTRAUTERINE CONTRACEPTIVE DEVICE: ICD-10-CM

## 2024-11-12 PROCEDURE — 76830 TRANSVAGINAL US NON-OB: CPT

## 2024-11-12 PROCEDURE — 58300 INSERT INTRAUTERINE DEVICE: CPT

## 2024-12-09 ENCOUNTER — APPOINTMENT (OUTPATIENT)
Dept: OBGYN | Facility: CLINIC | Age: 29
End: 2024-12-09

## 2025-02-24 ENCOUNTER — TRANSCRIPTION ENCOUNTER (OUTPATIENT)
Age: 30
End: 2025-02-24